# Patient Record
Sex: FEMALE | Race: OTHER | HISPANIC OR LATINO | URBAN - METROPOLITAN AREA
[De-identification: names, ages, dates, MRNs, and addresses within clinical notes are randomized per-mention and may not be internally consistent; named-entity substitution may affect disease eponyms.]

---

## 2024-04-04 ENCOUNTER — EMERGENCY (EMERGENCY)
Facility: HOSPITAL | Age: 44
LOS: 1 days | Discharge: HOME | End: 2024-04-04
Attending: STUDENT IN AN ORGANIZED HEALTH CARE EDUCATION/TRAINING PROGRAM
Payer: COMMERCIAL

## 2024-04-04 VITALS
RESPIRATION RATE: 17 BRPM | HEART RATE: 72 BPM | SYSTOLIC BLOOD PRESSURE: 119 MMHG | TEMPERATURE: 98 F | OXYGEN SATURATION: 99 % | DIASTOLIC BLOOD PRESSURE: 80 MMHG

## 2024-04-04 VITALS
HEIGHT: 60 IN | TEMPERATURE: 98 F | RESPIRATION RATE: 16 BRPM | OXYGEN SATURATION: 99 % | WEIGHT: 188.5 LBS | HEART RATE: 82 BPM

## 2024-04-04 LAB
ALBUMIN SERPL ELPH-MCNC: 4.2 G/DL — SIGNIFICANT CHANGE UP (ref 3.3–5.2)
ALP SERPL-CCNC: 121 U/L — HIGH (ref 40–120)
ALT FLD-CCNC: 47 U/L — HIGH
ANION GAP SERPL CALC-SCNC: 14 MMOL/L — SIGNIFICANT CHANGE UP (ref 5–17)
APPEARANCE UR: ABNORMAL
AST SERPL-CCNC: 47 U/L — HIGH
BACTERIA # UR AUTO: NEGATIVE /HPF — SIGNIFICANT CHANGE UP
BASOPHILS # BLD AUTO: 0.06 K/UL — SIGNIFICANT CHANGE UP (ref 0–0.2)
BASOPHILS NFR BLD AUTO: 0.6 % — SIGNIFICANT CHANGE UP (ref 0–2)
BILIRUB SERPL-MCNC: 0.3 MG/DL — LOW (ref 0.4–2)
BILIRUB UR-MCNC: ABNORMAL
BUN SERPL-MCNC: 11.5 MG/DL — SIGNIFICANT CHANGE UP (ref 8–20)
CALCIUM SERPL-MCNC: 9 MG/DL — SIGNIFICANT CHANGE UP (ref 8.4–10.5)
CHLORIDE SERPL-SCNC: 106 MMOL/L — SIGNIFICANT CHANGE UP (ref 96–108)
CO2 SERPL-SCNC: 23 MMOL/L — SIGNIFICANT CHANGE UP (ref 22–29)
COLOR SPEC: ABNORMAL
CREAT SERPL-MCNC: 1.08 MG/DL — SIGNIFICANT CHANGE UP (ref 0.5–1.3)
DIFF PNL FLD: ABNORMAL
EGFR: 65 ML/MIN/1.73M2 — SIGNIFICANT CHANGE UP
EOSINOPHIL # BLD AUTO: 0.26 K/UL — SIGNIFICANT CHANGE UP (ref 0–0.5)
EOSINOPHIL NFR BLD AUTO: 2.5 % — SIGNIFICANT CHANGE UP (ref 0–6)
GLUCOSE SERPL-MCNC: 82 MG/DL — SIGNIFICANT CHANGE UP (ref 70–99)
GLUCOSE UR QL: NEGATIVE MG/DL — SIGNIFICANT CHANGE UP
HCG SERPL-ACNC: <4 MIU/ML — SIGNIFICANT CHANGE UP
HCT VFR BLD CALC: 35.2 % — SIGNIFICANT CHANGE UP (ref 34.5–45)
HGB BLD-MCNC: 11.7 G/DL — SIGNIFICANT CHANGE UP (ref 11.5–15.5)
IMM GRANULOCYTES NFR BLD AUTO: 0.3 % — SIGNIFICANT CHANGE UP (ref 0–0.9)
KETONES UR-MCNC: NEGATIVE MG/DL — SIGNIFICANT CHANGE UP
LEUKOCYTE ESTERASE UR-ACNC: ABNORMAL
LIDOCAIN IGE QN: 17 U/L — LOW (ref 22–51)
LYMPHOCYTES # BLD AUTO: 4.76 K/UL — HIGH (ref 1–3.3)
LYMPHOCYTES # BLD AUTO: 46.2 % — HIGH (ref 13–44)
MCHC RBC-ENTMCNC: 28.8 PG — SIGNIFICANT CHANGE UP (ref 27–34)
MCHC RBC-ENTMCNC: 33.2 GM/DL — SIGNIFICANT CHANGE UP (ref 32–36)
MCV RBC AUTO: 86.7 FL — SIGNIFICANT CHANGE UP (ref 80–100)
MONOCYTES # BLD AUTO: 0.6 K/UL — SIGNIFICANT CHANGE UP (ref 0–0.9)
MONOCYTES NFR BLD AUTO: 5.8 % — SIGNIFICANT CHANGE UP (ref 2–14)
NEUTROPHILS # BLD AUTO: 4.59 K/UL — SIGNIFICANT CHANGE UP (ref 1.8–7.4)
NEUTROPHILS NFR BLD AUTO: 44.6 % — SIGNIFICANT CHANGE UP (ref 43–77)
NITRITE UR-MCNC: POSITIVE
PH UR: 6.5 — SIGNIFICANT CHANGE UP (ref 5–8)
PLATELET # BLD AUTO: 347 K/UL — SIGNIFICANT CHANGE UP (ref 150–400)
POTASSIUM SERPL-MCNC: 4 MMOL/L — SIGNIFICANT CHANGE UP (ref 3.5–5.3)
POTASSIUM SERPL-SCNC: 4 MMOL/L — SIGNIFICANT CHANGE UP (ref 3.5–5.3)
PROT SERPL-MCNC: 7.4 G/DL — SIGNIFICANT CHANGE UP (ref 6.6–8.7)
PROT UR-MCNC: 100 MG/DL
RBC # BLD: 4.06 M/UL — SIGNIFICANT CHANGE UP (ref 3.8–5.2)
RBC # FLD: 13.8 % — SIGNIFICANT CHANGE UP (ref 10.3–14.5)
RBC CASTS # UR COMP ASSIST: >1900 /HPF — HIGH (ref 0–4)
SODIUM SERPL-SCNC: 143 MMOL/L — SIGNIFICANT CHANGE UP (ref 135–145)
SP GR SPEC: 1.03 — SIGNIFICANT CHANGE UP (ref 1–1.03)
SQUAMOUS # UR AUTO: 4 /HPF — SIGNIFICANT CHANGE UP (ref 0–5)
UROBILINOGEN FLD QL: 1 MG/DL — SIGNIFICANT CHANGE UP (ref 0.2–1)
WBC # BLD: 10.3 K/UL — SIGNIFICANT CHANGE UP (ref 3.8–10.5)
WBC # FLD AUTO: 10.3 K/UL — SIGNIFICANT CHANGE UP (ref 3.8–10.5)
WBC UR QL: 25 /HPF — HIGH (ref 0–5)

## 2024-04-04 PROCEDURE — 99285 EMERGENCY DEPT VISIT HI MDM: CPT

## 2024-04-04 RX ORDER — CEFTRIAXONE 500 MG/1
1000 INJECTION, POWDER, FOR SOLUTION INTRAMUSCULAR; INTRAVENOUS ONCE
Refills: 0 | Status: DISCONTINUED | OUTPATIENT
Start: 2024-04-04 | End: 2024-04-04

## 2024-04-04 RX ORDER — MORPHINE SULFATE 50 MG/1
4 CAPSULE, EXTENDED RELEASE ORAL ONCE
Refills: 0 | Status: DISCONTINUED | OUTPATIENT
Start: 2024-04-04 | End: 2024-04-04

## 2024-04-04 RX ORDER — CEFTRIAXONE 500 MG/1
1000 INJECTION, POWDER, FOR SOLUTION INTRAMUSCULAR; INTRAVENOUS ONCE
Refills: 0 | Status: COMPLETED | OUTPATIENT
Start: 2024-04-04 | End: 2024-04-04

## 2024-04-04 RX ORDER — ACETAMINOPHEN 500 MG
1000 TABLET ORAL ONCE
Refills: 0 | Status: COMPLETED | OUTPATIENT
Start: 2024-04-04 | End: 2024-04-04

## 2024-04-04 RX ORDER — KETOROLAC TROMETHAMINE 30 MG/ML
30 SYRINGE (ML) INJECTION ONCE
Refills: 0 | Status: DISCONTINUED | OUTPATIENT
Start: 2024-04-04 | End: 2024-04-04

## 2024-04-04 RX ORDER — SODIUM CHLORIDE 9 MG/ML
1000 INJECTION INTRAMUSCULAR; INTRAVENOUS; SUBCUTANEOUS ONCE
Refills: 0 | Status: COMPLETED | OUTPATIENT
Start: 2024-04-04 | End: 2024-04-04

## 2024-04-04 RX ADMIN — MORPHINE SULFATE 4 MILLIGRAM(S): 50 CAPSULE, EXTENDED RELEASE ORAL at 23:18

## 2024-04-04 RX ADMIN — MORPHINE SULFATE 4 MILLIGRAM(S): 50 CAPSULE, EXTENDED RELEASE ORAL at 21:28

## 2024-04-04 RX ADMIN — Medication 30 MILLIGRAM(S): at 22:08

## 2024-04-04 RX ADMIN — Medication 400 MILLIGRAM(S): at 20:58

## 2024-04-04 RX ADMIN — SODIUM CHLORIDE 1000 MILLILITER(S): 9 INJECTION INTRAMUSCULAR; INTRAVENOUS; SUBCUTANEOUS at 20:59

## 2024-04-04 RX ADMIN — CEFTRIAXONE 1000 MILLIGRAM(S): 500 INJECTION, POWDER, FOR SOLUTION INTRAMUSCULAR; INTRAVENOUS at 22:07

## 2024-04-04 RX ADMIN — Medication 1000 MILLIGRAM(S): at 21:28

## 2024-04-04 RX ADMIN — MORPHINE SULFATE 4 MILLIGRAM(S): 50 CAPSULE, EXTENDED RELEASE ORAL at 20:58

## 2024-04-04 RX ADMIN — Medication 1000 MILLIGRAM(S): at 21:15

## 2024-04-04 NOTE — ED PROVIDER NOTE - OBJECTIVE STATEMENT
43F hx of ureterolithiasis diagnosed in [place] (where she recently moved here from) presenting for evaluation of suprapubic pain and recurrent hematuria; feels like when she had he kidney stone before, notes she has a stent currently which was going to be removed but she has since changed residence to [place] and has not yet had time to establish care with urology. Denies fevers, +chills, +dysuria and hematuria. Does not remember what side her kidney stone was on or which side the stent is on, was told she needed the stent because the stone was too large to pass. Notes she had surpapubic pain when dx with the stone last time as well.

## 2024-04-04 NOTE — ED PROVIDER NOTE - NSFOLLOWUPCLINICS_GEN_ALL_ED_FT
[facility] Acute Care Surgery  Acute Care Surgery  [address] [place] [id number]  Phone: [phone]  Fax:

## 2024-04-04 NOTE — ED PROVIDER NOTE - PHYSICAL EXAMINATION
Gen: NAD, non-toxic, conversational  Eyes: PERRLA, EOMI   HENT: Normocephalic, atraumatic. External ears normal, no rhinorrhea, moist mucous membranes.   CV: RRR, no M/R/G  Resp: CTAB, non-labored, speaking without difficulty on room air  Abd: soft, +suprapubic ttp, o/w non-tender, non rigid, no guarding or rebound tenderness  Back: No CVAT bilaterally, no midline ttp  Skin: dry, wwp   Neuro: AOx3, speech is fluent and appropriate  Psych: Mood ok, affect euthymic

## 2024-04-04 NOTE — ED ADULT NURSE NOTE - SUICIDE SCREENING DEPRESSION
Problem: Falls - Risk of:  Goal: Will remain free from falls  Description: Will remain free from falls  1/22/2022 0425 by Candida John LPN  Outcome: Ongoing     Problem: Falls - Risk of:  Goal: Absence of physical injury  Description: Absence of physical injury  Outcome: Ongoing     Problem: Skin Integrity:  Goal: Will show no infection signs and symptoms  Description: Will show no infection signs and symptoms  1/22/2022 0425 by Candida John LPN  Outcome: Ongoing     Problem: Skin Integrity:  Goal: Absence of new skin breakdown  Description: Absence of new skin breakdown  Outcome: Ongoing     Problem: Pain:  Goal: Pain level will decrease  Description: Pain level will decrease  1/22/2022 0425 by Candida John LPN  Outcome: Ongoing     Problem: Pain:  Goal: Control of acute pain  Description: Control of acute pain  Outcome: Ongoing     Problem: Pain:  Goal: Control of chronic pain  Description: Control of chronic pain  Outcome: Ongoing     Problem: Musculor/Skeletal Functional Status  Goal: Highest potential functional level  Outcome: Ongoing     Problem: Musculor/Skeletal Functional Status  Goal: Absence of falls  Outcome: Ongoing     Problem: Nutrition  Goal: Optimal nutrition therapy  Outcome: Ongoing Negative

## 2024-04-04 NOTE — ED PROVIDER NOTE - CARE PROVIDER_API CALL
Jakob Zamora  Urology  [address], [address]  [place] [address]  Phone: [phone]  Fax: [phone]  Follow Up Time:

## 2024-04-04 NOTE — ED ADULT NURSE NOTE - NSFALLUNIVINTERV_ED_ALL_ED
Bed/Stretcher in lowest position, wheels locked, appropriate side rails in place/Call bell, personal items and telephone in reach/Instruct patient to call for assistance before getting out of bed/chair/stretcher/Non-slip footwear applied when patient is off stretcher/[place] to call system/Physically safe environment - no spills, clutter or unnecessary equipment/Purposeful proactive rounding/Room/bathroom lighting operational, light cord in reach

## 2024-04-04 NOTE — ED ADULT TRIAGE NOTE - CHIEF COMPLAINT QUOTE
Pt was diagnosed with large kidney stones in the beginning of March. She had a stent placed and needs to follow up with urology but she just moved to [place] and doesn't have one yet. She now has increased right flank pain, hematuria, and only able to urinate little drops at a time.

## 2024-04-04 NOTE — ED ADULT NURSE NOTE - OBJECTIVE STATEMENT
pt was recently dx with kidney stones and had stents placed in 3/10 in [place]. She was told to f/u with urologist to have stents removed but pt moved to [place] and has not been able to see an urologist. For the last few days, pt has been having right flank pain radiating to lower back and BLQ. Pt also endorse urgency, hematuria, burning during voiding, nausea and fatigue.  AO4, ED workup.

## 2024-04-04 NOTE — ED PROVIDER NOTE - CLINICAL SUMMARY MEDICAL DECISION MAKING FREE TEXT BOX
43F presenting for evaluation of abdominal pain in setting of recent ureteral stenting for large ureterolithiasis, does have discharge paperwork with her unfortunately does not have any information about when stent was placed or laterality; had been done in [place]. Will check labs, CT renal to assess stent, follow up studies, reassess, possible urology consultation pending findings, dispo pending clinical course and results. 43F presenting for evaluation of abdominal pain in setting of recent ureteral stenting for large ureterolithiasis, does have discharge paperwork with her unfortunately does not have any information about when stent was placed or laterality; had been done in [place]. Will check labs, CT renal to assess stent, follow up studies, reassess, possible urology consultation pending findings, dispo pending clinical course and results.    Pt reassessed, pt feeling better at this time, vss, pt able to walk, talk and vocalized plan of action. Discussed in depth and explained to pt in depth the next steps that need to be taken including proper follow up with PCP or specialists. All incidental findings were discussed with pt as well. Pt verbalized their concerns and all questions were answered. Pt understands dispo and wants discharge. Given good instructions when to return to ED, strict return precautions and importance of f/u.

## 2024-04-04 NOTE — ED PROVIDER NOTE - PATIENT PORTAL LINK FT
You can access the FollowMyHealth Patient Portal offered by [facility] by registering at the following website: [email or web address]. By joining [employer]’s FollowMyHealth portal, you will also be able to view your health information using other applications (apps) compatible with our system.

## 2024-04-05 LAB
ALBUMIN SERPL ELPH-MCNC: 3.8 G/DL — SIGNIFICANT CHANGE UP (ref 3.3–5.2)
ALP SERPL-CCNC: 102 U/L — SIGNIFICANT CHANGE UP (ref 40–120)
ALT FLD-CCNC: 37 U/L — HIGH
AST SERPL-CCNC: 39 U/L — HIGH
BILIRUB DIRECT SERPL-MCNC: 0.1 MG/DL — SIGNIFICANT CHANGE UP (ref 0–0.3)
BILIRUB INDIRECT FLD-MCNC: 0.2 MG/DL — SIGNIFICANT CHANGE UP (ref 0.2–1)
BILIRUB SERPL-MCNC: 0.3 MG/DL — LOW (ref 0.4–2)
PROT SERPL-MCNC: 6.6 G/DL — SIGNIFICANT CHANGE UP (ref 6.6–8.7)

## 2024-04-05 PROCEDURE — 76705 ECHO EXAM OF ABDOMEN: CPT

## 2024-04-05 PROCEDURE — 80053 COMPREHEN METABOLIC PANEL: CPT

## 2024-04-05 PROCEDURE — 84702 CHORIONIC GONADOTROPIN TEST: CPT

## 2024-04-05 PROCEDURE — 85025 COMPLETE CBC W/AUTO DIFF WBC: CPT

## 2024-04-05 PROCEDURE — 87086 URINE CULTURE/COLONY COUNT: CPT

## 2024-04-05 PROCEDURE — 96376 TX/PRO/DX INJ SAME DRUG ADON: CPT

## 2024-04-05 PROCEDURE — 36415 COLL VENOUS BLD VENIPUNCTURE: CPT

## 2024-04-05 PROCEDURE — 96375 TX/PRO/DX INJ NEW DRUG ADDON: CPT

## 2024-04-05 PROCEDURE — 83690 ASSAY OF LIPASE: CPT

## 2024-04-05 PROCEDURE — 76705 ECHO EXAM OF ABDOMEN: CPT | Mod: 26

## 2024-04-05 PROCEDURE — 74176 CT ABD & PELVIS W/O CONTRAST: CPT | Mod: 26,MC

## 2024-04-05 PROCEDURE — 74176 CT ABD & PELVIS W/O CONTRAST: CPT | Mod: MC

## 2024-04-05 PROCEDURE — 99284 EMERGENCY DEPT VISIT MOD MDM: CPT | Mod: 25

## 2024-04-05 PROCEDURE — 96365 THER/PROPH/DIAG IV INF INIT: CPT

## 2024-04-05 PROCEDURE — 80076 HEPATIC FUNCTION PANEL: CPT

## 2024-04-05 PROCEDURE — 81001 URINALYSIS AUTO W/SCOPE: CPT

## 2024-04-05 RX ORDER — IBUPROFEN 200 MG
1 TABLET ORAL
Qty: 28 | Refills: 0
Start: 2024-04-05 | End: 2024-04-11

## 2024-04-05 RX ORDER — MORPHINE SULFATE 50 MG/1
4 CAPSULE, EXTENDED RELEASE ORAL ONCE
Refills: 0 | Status: DISCONTINUED | OUTPATIENT
Start: 2024-04-05 | End: 2024-04-05

## 2024-04-05 RX ORDER — CEFPODOXIME PROXETIL 100 MG
1 TABLET ORAL
Qty: 14 | Refills: 0
Start: 2024-04-05 | End: 2024-04-11

## 2024-04-05 RX ADMIN — MORPHINE SULFATE 4 MILLIGRAM(S): 50 CAPSULE, EXTENDED RELEASE ORAL at 01:45

## 2024-04-05 RX ADMIN — MORPHINE SULFATE 4 MILLIGRAM(S): 50 CAPSULE, EXTENDED RELEASE ORAL at 01:29

## 2024-04-05 RX ADMIN — Medication 30 MILLIGRAM(S): at 01:29

## 2024-04-06 LAB
CULTURE RESULTS: ABNORMAL
SPECIMEN SOURCE: SIGNIFICANT CHANGE UP

## 2024-04-09 ENCOUNTER — APPOINTMENT (OUTPATIENT)
Dept: TRAUMA SURGERY | Facility: CLINIC | Age: 44
End: 2024-04-09

## 2024-04-09 PROBLEM — Z00.00 ENCOUNTER FOR PREVENTIVE HEALTH EXAMINATION: Status: ACTIVE | Noted: 2024-04-09

## 2024-04-11 ENCOUNTER — APPOINTMENT (OUTPATIENT)
Dept: UROLOGY | Facility: CLINIC | Age: 44
End: 2024-04-11
Payer: COMMERCIAL

## 2024-04-11 DIAGNOSIS — Z87.39 PERSONAL HISTORY OF OTHER DISEASES OF THE MUSCULOSKELETAL SYSTEM AND CONNECTIVE TISSUE: ICD-10-CM

## 2024-04-11 DIAGNOSIS — Z78.9 OTHER SPECIFIED HEALTH STATUS: ICD-10-CM

## 2024-04-11 DIAGNOSIS — M50.30 OTHER CERVICAL DISC DEGENERATION, UNSPECIFIED CERVICAL REGION: ICD-10-CM

## 2024-04-11 PROCEDURE — 99204 OFFICE O/P NEW MOD 45 MIN: CPT

## 2024-04-11 NOTE — REVIEW OF SYSTEMS
[Feeling Tired] : feeling tired [Abdominal Pain] : abdominal pain [Vomiting] : vomiting [Constipation] : constipation [Blood in urine that you can see] : blood visible in urine [History of kidney stones] : history of kidney stones [see HPI] : see HPI [Joint Pain] : joint pain [Negative] : Heme/Lymph

## 2024-04-12 PROBLEM — Z78.9 NON-SMOKER: Status: ACTIVE | Noted: 2024-04-12

## 2024-04-12 PROBLEM — M50.30 BULGING OF CERVICAL INTERVERTEBRAL DISC: Status: RESOLVED | Noted: 2024-04-12 | Resolved: 2024-04-12

## 2024-04-12 PROBLEM — Z87.39 HISTORY OF LOW BACK PAIN: Status: RESOLVED | Noted: 2024-04-12 | Resolved: 2024-04-12

## 2024-04-12 NOTE — PHYSICAL EXAM
[Normal Appearance] : normal appearance [General Appearance - In No Acute Distress] : no acute distress [Abdomen Soft] : soft [] : no respiratory distress [RUQ] : in the right upper quadrant [Normal Station and Gait] : the gait and station were normal for the patient's age [Skin Color & Pigmentation] : normal skin color and pigmentation [No Focal Deficits] : no focal deficits [Oriented To Time, Place, And Person] : oriented to person, place, and time [Costovertebral Angle Tenderness] : no ~M costovertebral angle tenderness [de-identified] : normal peripheral circulation

## 2024-04-12 NOTE — ASSESSMENT
[FreeTextEntry1] : Reviewed records. Discussed labs and imaging.   4/4/2024: WBC: 10.10, Creatinine: 1.08 Urine culture: Streptococcus agalactiae CT scan of the abdomen and pelvis: 1 cm right lower pole kidney stone with indwelling ureteral stent. Multiple left kidney stones, largest 0.6 cm.  Disorder of calcium metabolism: Discussed Kidney stone prevention diet: Recommended good oral hydration so that urine is clear to light yellow, usually 1.5 to 2 Liters of fluids, mainly water. Recommended increasing Citrate in diet by consuming citrus fruits and juices: holly, limes, oranges, grapefruits and berries. Recommended to consume less red meat. Recommended less salt intake.  Recommended to limit foods with oxalate: dark green vegetables, rhubarb, chocolate, wheat bran, nuts, cranberries, and beans.  Patient was provided with instruction sheet.  Will do kidney stone metabolic workup at a later date.  Kidney stone: Discussed the options of Observation Vs Medical management Vs Ureteroscopy Vs Shock wave lithotripsy Vs Percutaneous nephrostolithotomy.  Risks and benefits of each modality were discussed. Patient agreeable to proceed with ureteroscopy. Discussed the procedure: Ureteroscopy. Risks associated including but not limited to bleeding, urinary tract infection, urinary sepsis, failure of the procedure.  Discussed risk of injury to the ureter including perforation and or avulsion. Discussed risk of injury to other surrounding structures, ureteral stricture formation, urinoma, urinary fistula, failure to remove all stone fragments and need for additional procedures: ESWL, repeat ureteroscopy, percutaneous or open surgery and or reconstructive surgery.  Discussed need for ureteral stent, pain and discomfort associated with it, possible risk of proximal or distal migration of it and need for subsequent Cystoscopy to remove the ureteral stent.  Discussed that Anesthesiologist will discuss the risks and complications associated with Anesthesia in more depth separately.  Will need medical clearance with PCP and presurgical testing at [facility]. Patient agreeable to proceed with Ureteroscopy.  Will schedule for next available date [facility].   Recommended to see surgeon for right-sided abdominal pain and gallbladder sludge.

## 2024-04-12 NOTE — HISTORY OF PRESENT ILLNESS
[FreeTextEntry1] : 43-year-old female presents for kidney stone and ureteral stent. Patient went to emergency department at the hospital in [place] in March with complaints of flank pain. As per patient a ureteral stent was placed for obstructing right kidney stone.  No prior episodes.  No family history of kidney stones. Patient has since relocated to [place], [place]. Patient went to [facility] ED on 4/4/2024 with complaints of right-sided abdominal pain and gross hematuria. CT scan abdomen and pelvis: Has bilateral kidney stones with indwelling right ureteral stent. Also had ultrasound, was told has gallbladder sludge.  Will be seeing surgeon for possible cholecystectomy. Patient was sent home on cefpodoxime, gross hematuria has resolved continues to have right-sided abdominal pain. Denies any difficulty with urination.  Denies nausea, vomiting, fever, chills or rigors.

## 2024-04-23 ENCOUNTER — APPOINTMENT (OUTPATIENT)
Dept: INTERNAL MEDICINE | Facility: CLINIC | Age: 44
End: 2024-04-23

## 2024-04-25 ENCOUNTER — RESULT REVIEW (OUTPATIENT)
Age: 44
End: 2024-04-25

## 2024-04-25 ENCOUNTER — OUTPATIENT (OUTPATIENT)
Dept: OUTPATIENT SERVICES | Facility: HOSPITAL | Age: 44
LOS: 1 days | End: 2024-04-25
Payer: COMMERCIAL

## 2024-04-25 VITALS
DIASTOLIC BLOOD PRESSURE: 54 MMHG | SYSTOLIC BLOOD PRESSURE: 121 MMHG | HEART RATE: 83 BPM | TEMPERATURE: 98 F | WEIGHT: 183.65 LBS | HEIGHT: 60 IN | RESPIRATION RATE: 16 BRPM | OXYGEN SATURATION: 98 %

## 2024-04-25 DIAGNOSIS — Z01.818 ENCOUNTER FOR OTHER PREPROCEDURAL EXAMINATION: ICD-10-CM

## 2024-04-25 DIAGNOSIS — Z45.89 ENCOUNTER FOR ADJUSTMENT AND MANAGEMENT OF OTHER IMPLANTED DEVICES: Chronic | ICD-10-CM

## 2024-04-25 DIAGNOSIS — Z90.79 ACQUIRED ABSENCE OF OTHER GENITAL ORGAN(S): Chronic | ICD-10-CM

## 2024-04-25 DIAGNOSIS — Z96.0 PRESENCE OF UROGENITAL IMPLANTS: Chronic | ICD-10-CM

## 2024-04-25 DIAGNOSIS — Z90.711 ACQUIRED ABSENCE OF UTERUS WITH REMAINING CERVICAL STUMP: Chronic | ICD-10-CM

## 2024-04-25 LAB
ABO RH CONFIRMATION: SIGNIFICANT CHANGE UP
ANION GAP SERPL CALC-SCNC: 3 MMOL/L — LOW (ref 5–17)
APPEARANCE UR: ABNORMAL
APTT BLD: 33.7 SEC — SIGNIFICANT CHANGE UP (ref 24.5–35.6)
BACTERIA # UR AUTO: NEGATIVE /HPF — SIGNIFICANT CHANGE UP
BASOPHILS # BLD AUTO: 0.04 K/UL — SIGNIFICANT CHANGE UP (ref 0–0.2)
BASOPHILS NFR BLD AUTO: 0.5 % — SIGNIFICANT CHANGE UP (ref 0–2)
BILIRUB UR-MCNC: NEGATIVE — SIGNIFICANT CHANGE UP
BLD GP AB SCN SERPL QL: SIGNIFICANT CHANGE UP
BUN SERPL-MCNC: 22 MG/DL — SIGNIFICANT CHANGE UP (ref 7–23)
CALCIUM SERPL-MCNC: 9.2 MG/DL — SIGNIFICANT CHANGE UP (ref 8.5–10.1)
CAST: 3 /LPF — SIGNIFICANT CHANGE UP (ref 0–4)
CHLORIDE SERPL-SCNC: 111 MMOL/L — HIGH (ref 96–108)
CO2 SERPL-SCNC: 25 MMOL/L — SIGNIFICANT CHANGE UP (ref 22–31)
COLOR SPEC: YELLOW — SIGNIFICANT CHANGE UP
CREAT SERPL-MCNC: 0.93 MG/DL — SIGNIFICANT CHANGE UP (ref 0.5–1.3)
DIFF PNL FLD: ABNORMAL
EGFR: 78 ML/MIN/1.73M2 — SIGNIFICANT CHANGE UP
EOSINOPHIL # BLD AUTO: 0.13 K/UL — SIGNIFICANT CHANGE UP (ref 0–0.5)
EOSINOPHIL NFR BLD AUTO: 1.5 % — SIGNIFICANT CHANGE UP (ref 0–6)
GLUCOSE SERPL-MCNC: 93 MG/DL — SIGNIFICANT CHANGE UP (ref 70–99)
GLUCOSE UR QL: NEGATIVE MG/DL — SIGNIFICANT CHANGE UP
HCT VFR BLD CALC: 37.9 % — SIGNIFICANT CHANGE UP (ref 34.5–45)
HGB BLD-MCNC: 12.2 G/DL — SIGNIFICANT CHANGE UP (ref 11.5–15.5)
IMM GRANULOCYTES NFR BLD AUTO: 0.2 % — SIGNIFICANT CHANGE UP (ref 0–0.9)
INR BLD: 0.93 RATIO — SIGNIFICANT CHANGE UP (ref 0.85–1.18)
KETONES UR-MCNC: NEGATIVE MG/DL — SIGNIFICANT CHANGE UP
LEUKOCYTE ESTERASE UR-ACNC: ABNORMAL
LYMPHOCYTES # BLD AUTO: 2.98 K/UL — SIGNIFICANT CHANGE UP (ref 1–3.3)
LYMPHOCYTES # BLD AUTO: 34 % — SIGNIFICANT CHANGE UP (ref 13–44)
MCHC RBC-ENTMCNC: 28.4 PG — SIGNIFICANT CHANGE UP (ref 27–34)
MCHC RBC-ENTMCNC: 32.2 GM/DL — SIGNIFICANT CHANGE UP (ref 32–36)
MCV RBC AUTO: 88.3 FL — SIGNIFICANT CHANGE UP (ref 80–100)
MONOCYTES # BLD AUTO: 0.54 K/UL — SIGNIFICANT CHANGE UP (ref 0–0.9)
MONOCYTES NFR BLD AUTO: 6.2 % — SIGNIFICANT CHANGE UP (ref 2–14)
NEUTROPHILS # BLD AUTO: 5.06 K/UL — SIGNIFICANT CHANGE UP (ref 1.8–7.4)
NEUTROPHILS NFR BLD AUTO: 57.6 % — SIGNIFICANT CHANGE UP (ref 43–77)
NITRITE UR-MCNC: NEGATIVE — SIGNIFICANT CHANGE UP
PH UR: 7 — SIGNIFICANT CHANGE UP (ref 5–8)
PLATELET # BLD AUTO: 348 K/UL — SIGNIFICANT CHANGE UP (ref 150–400)
POTASSIUM SERPL-MCNC: 4.2 MMOL/L — SIGNIFICANT CHANGE UP (ref 3.5–5.3)
POTASSIUM SERPL-SCNC: 4.2 MMOL/L — SIGNIFICANT CHANGE UP (ref 3.5–5.3)
PROT UR-MCNC: 30 MG/DL
PROTHROM AB SERPL-ACNC: 10.5 SEC — SIGNIFICANT CHANGE UP (ref 9.5–13)
RBC # BLD: 4.29 M/UL — SIGNIFICANT CHANGE UP (ref 3.8–5.2)
RBC # FLD: 13.2 % — SIGNIFICANT CHANGE UP (ref 10.3–14.5)
RBC CASTS # UR COMP ASSIST: 523 /HPF — HIGH (ref 0–4)
SODIUM SERPL-SCNC: 139 MMOL/L — SIGNIFICANT CHANGE UP (ref 135–145)
SP GR SPEC: 1.02 — SIGNIFICANT CHANGE UP (ref 1–1.03)
SQUAMOUS # UR AUTO: 2 /HPF — SIGNIFICANT CHANGE UP (ref 0–5)
UROBILINOGEN FLD QL: 1 MG/DL — SIGNIFICANT CHANGE UP (ref 0.2–1)
WBC # BLD: 8.77 K/UL — SIGNIFICANT CHANGE UP (ref 3.8–10.5)
WBC # FLD AUTO: 8.77 K/UL — SIGNIFICANT CHANGE UP (ref 3.8–10.5)
WBC UR QL: 5 /HPF — SIGNIFICANT CHANGE UP (ref 0–5)

## 2024-04-25 PROCEDURE — 36415 COLL VENOUS BLD VENIPUNCTURE: CPT

## 2024-04-25 PROCEDURE — 87086 URINE CULTURE/COLONY COUNT: CPT

## 2024-04-25 PROCEDURE — 86850 RBC ANTIBODY SCREEN: CPT

## 2024-04-25 PROCEDURE — 71046 X-RAY EXAM CHEST 2 VIEWS: CPT

## 2024-04-25 PROCEDURE — 99214 OFFICE O/P EST MOD 30 MIN: CPT | Mod: 25

## 2024-04-25 PROCEDURE — 80048 BASIC METABOLIC PNL TOTAL CA: CPT

## 2024-04-25 PROCEDURE — 85730 THROMBOPLASTIN TIME PARTIAL: CPT

## 2024-04-25 PROCEDURE — 86900 BLOOD TYPING SEROLOGIC ABO: CPT

## 2024-04-25 PROCEDURE — 86901 BLOOD TYPING SEROLOGIC RH(D): CPT

## 2024-04-25 PROCEDURE — 85025 COMPLETE CBC W/AUTO DIFF WBC: CPT

## 2024-04-25 PROCEDURE — 93005 ELECTROCARDIOGRAM TRACING: CPT

## 2024-04-25 PROCEDURE — 93010 ELECTROCARDIOGRAM REPORT: CPT

## 2024-04-25 PROCEDURE — 71046 X-RAY EXAM CHEST 2 VIEWS: CPT | Mod: 26

## 2024-04-25 PROCEDURE — 81001 URINALYSIS AUTO W/SCOPE: CPT

## 2024-04-25 PROCEDURE — 85610 PROTHROMBIN TIME: CPT

## 2024-04-25 NOTE — H&P PST ADULT - NSICDXPASTSURGICALHX_GEN_ALL_CORE_FT
PAST SURGICAL HISTORY:  Encounter for removal of Essure     History of bilateral salpingectomy     S/P cystoscopy with ureteral stent placement

## 2024-04-25 NOTE — H&P PST ADULT - MAMMOGRAM, LAST, PROFILE
Detail Level: Detailed
Patient Specific Counseling (Will Not Stick From Patient To Patient): Continue Ketoconazole shampoo 2-3x weekly prn flares and Clobetasol solution BID x1-2 weeks prn flares; take breaks when not flaring
Patient Specific Counseling (Will Not Stick From Patient To Patient): Continue Fluocinonide cream BID x1-2 weeks prn flares; take breaks when not flaring
none

## 2024-04-25 NOTE — H&P PST ADULT - HISTORY OF PRESENT ILLNESS
43 year old female with right renal calculi c/o flank pain and hematuria; Had ureteral stent placed in [place] in March 2024; she presents to [facility] for planned right ureteroscopy laser lithotripsy stone extraction and ureteral stent exchange

## 2024-04-25 NOTE — H&P PST ADULT - NSICDXFAMILYHX_GEN_ALL_CORE_FT
FAMILY HISTORY:  Father  Still living? Unknown  FH: heart disease, Age at diagnosis: Age Unknown    Mother  Still living? Unknown  Family history of high cholesterol, Age at diagnosis: Age Unknown  Family history of lupus erythematosus, Age at diagnosis: Age Unknown  FH: asthma, Age at diagnosis: Age Unknown  FH: chronic kidney disease, Age at diagnosis: Age Unknown  FH: COPD (chronic obstructive pulmonary disease), Age at diagnosis: Age Unknown  FH: HTN (hypertension), Age at diagnosis: Age Unknown  FH: hypothyroidism, Age at diagnosis: Age Unknown  FH: type 2 diabetes, Age at diagnosis: Age Unknown    Grandparent  Still living? Unknown  FH: heart disease, Age at diagnosis: Age Unknown

## 2024-04-25 NOTE — H&P PST ADULT - NSANTHOSAYNRD_GEN_A_CORE
No. LAYA screening performed.  STOP BANG Legend: 0-2 = LOW Risk; 3-4 = INTERMEDIATE Risk; 5-8 = HIGH Risk

## 2024-04-25 NOTE — H&P PST ADULT - ASSESSMENT
Plan:  1. PST instructions given ; NPO status/  instructions to be given by ASU   2. Pt instructed to take following meds on day of surgery:   3. Pt instructed to take routine evening medications unless indicated   4. Stop NSAIDS ( Aspirin Alev Motrin Mobic Diclofenac), herbal supplements , MVI , Vitamin fish oil 7 days prior to surgery  unless   directed by surgeon or cardiologist;   5. Medical Optimization  with    6. Urine for pregnancy on day of surgery   7. Labs EKG CXR as per surgeon request   8. Pt denies covid symptoms shortness of breath fever cough      43 year old female with right renal calculi c/o flank pain and hematuria; Had ureteral stent placed in [place] in March 2024; she presents to [facility] for planned right ureteroscopy laser lithotripsy stone extraction and ureteral stent exchange         Plan:  1. PST instructions given ; NPO status/  instructions to be given by ASU   2. Pt instructed to take following meds on day of surgery:   3. Pt instructed to take routine evening medications unless indicated   4. Stop NSAIDS ( Aspirin Alev Motrin Mobic Diclofenac), herbal supplements , MVI , Vitamin fish oil 7 days prior to surgery  unless   directed by surgeon or cardiologist;   5. Medical Optimization  with Dr Zambrano  6. Pt denies covid symptoms shortness of breath fever cough   7. Labs EKG CXR as per surgeon request

## 2024-04-25 NOTE — H&P PST ADULT - NSICDXPASTMEDICALHX_GEN_ALL_CORE_FT
PAST MEDICAL HISTORY:  Bleeding gastric ulcer     Cervical herniated disc     Chronic sinusitis     Degenerative lumbar disc     Fibromyalgia     Gallbladder sludge     Gastric peptic ulcer     HPV in female     Migraine     Obese     Right kidney stone     Seasonal allergies      PAST MEDICAL HISTORY:  Bleeding gastric ulcer     Cervical herniated disc     Chronic sinusitis     Degenerative lumbar disc     Fibromyalgia     Gallbladder sludge     Gastric peptic ulcer     HPV in female     Migraine     Obese     Obesity     Right kidney stone     Seasonal allergies

## 2024-04-26 DIAGNOSIS — Z01.818 ENCOUNTER FOR OTHER PREPROCEDURAL EXAMINATION: ICD-10-CM

## 2024-04-27 LAB
CULTURE RESULTS: SIGNIFICANT CHANGE UP
SPECIMEN SOURCE: SIGNIFICANT CHANGE UP

## 2024-04-30 ENCOUNTER — NON-APPOINTMENT (OUTPATIENT)
Age: 44
End: 2024-04-30

## 2024-04-30 ENCOUNTER — APPOINTMENT (OUTPATIENT)
Dept: INTERNAL MEDICINE | Facility: CLINIC | Age: 44
End: 2024-04-30
Payer: COMMERCIAL

## 2024-04-30 VITALS
BODY MASS INDEX: 35.34 KG/M2 | HEIGHT: 60 IN | HEART RATE: 97 BPM | OXYGEN SATURATION: 97 % | SYSTOLIC BLOOD PRESSURE: 126 MMHG | DIASTOLIC BLOOD PRESSURE: 78 MMHG | WEIGHT: 180 LBS | RESPIRATION RATE: 14 BRPM

## 2024-04-30 DIAGNOSIS — Z01.818 ENCOUNTER FOR OTHER PREPROCEDURAL EXAMINATION: ICD-10-CM

## 2024-04-30 DIAGNOSIS — G47.09 OTHER INSOMNIA: ICD-10-CM

## 2024-04-30 DIAGNOSIS — M79.7 FIBROMYALGIA: ICD-10-CM

## 2024-04-30 DIAGNOSIS — Z82.49 FAMILY HISTORY OF ISCHEMIC HEART DISEASE AND OTHER DISEASES OF THE CIRCULATORY SYSTEM: ICD-10-CM

## 2024-04-30 DIAGNOSIS — G43.909 MIGRAINE, UNSPECIFIED, NOT INTRACTABLE, W/OUT STATUS MIGRAINOSUS: ICD-10-CM

## 2024-04-30 DIAGNOSIS — Z82.69 FAMILY HISTORY OF OTHER DISEASES OF THE MUSCULOSKELETAL SYSTEM AND CONNECTIVE TISSUE: ICD-10-CM

## 2024-04-30 DIAGNOSIS — Z83.438 FAMILY HISTORY OF OTHER DISORDER OF LIPOPROTEIN METABOLISM AND OTHER LIPIDEMIA: ICD-10-CM

## 2024-04-30 PROBLEM — J30.2 OTHER SEASONAL ALLERGIC RHINITIS: Chronic | Status: ACTIVE | Noted: 2024-04-25

## 2024-04-30 PROBLEM — N20.0 CALCULUS OF KIDNEY: Chronic | Status: ACTIVE | Noted: 2024-04-25

## 2024-04-30 PROBLEM — K82.8 OTHER SPECIFIED DISEASES OF GALLBLADDER: Chronic | Status: ACTIVE | Noted: 2024-04-25

## 2024-04-30 PROBLEM — E66.9 OBESITY, UNSPECIFIED: Chronic | Status: ACTIVE | Noted: 2024-04-25

## 2024-04-30 PROBLEM — M51.36 OTHER INTERVERTEBRAL DISC DEGENERATION, LUMBAR REGION: Chronic | Status: ACTIVE | Noted: 2024-04-25

## 2024-04-30 PROBLEM — M50.20 OTHER CERVICAL DISC DISPLACEMENT, UNSPECIFIED CERVICAL REGION: Chronic | Status: ACTIVE | Noted: 2024-04-25

## 2024-04-30 PROBLEM — J32.9 CHRONIC SINUSITIS, UNSPECIFIED: Chronic | Status: ACTIVE | Noted: 2024-04-25

## 2024-04-30 PROBLEM — B97.7 PAPILLOMAVIRUS AS THE CAUSE OF DISEASES CLASSIFIED ELSEWHERE: Chronic | Status: ACTIVE | Noted: 2024-04-25

## 2024-04-30 PROBLEM — K25.9 GASTRIC ULCER, UNSPECIFIED AS ACUTE OR CHRONIC, WITHOUT HEMORRHAGE OR PERFORATION: Chronic | Status: ACTIVE | Noted: 2024-04-25

## 2024-04-30 PROBLEM — K25.4 CHRONIC OR UNSPECIFIED GASTRIC ULCER WITH HEMORRHAGE: Chronic | Status: ACTIVE | Noted: 2024-04-25

## 2024-04-30 PROCEDURE — 99203 OFFICE O/P NEW LOW 30 MIN: CPT

## 2024-04-30 RX ORDER — PREGABALIN 200 MG/1
200 CAPSULE ORAL TWICE DAILY
Refills: 0 | Status: ACTIVE | COMMUNITY

## 2024-04-30 RX ORDER — OXYCODONE HCL/ACETAMINOPHEN 7.5-500 MG
500-7.5 TABLET ORAL
Refills: 0 | Status: ACTIVE | COMMUNITY

## 2024-04-30 RX ORDER — TOPIRAMATE 50 MG/1
50 TABLET, COATED ORAL TWICE DAILY
Refills: 0 | Status: ACTIVE | COMMUNITY

## 2024-04-30 RX ORDER — PREGABALIN 300 MG/1
CAPSULE ORAL
Refills: 0 | Status: DISCONTINUED | COMMUNITY

## 2024-04-30 RX ORDER — TOPIRAMATE 50 MG/1
TABLET, COATED ORAL
Refills: 0 | Status: DISCONTINUED | COMMUNITY

## 2024-04-30 NOTE — ASSESSMENT
[High Risk Surgery - Intraperitoneal, Intrathoracic or Supringuinal Vascular Procedures] : High Risk Surgery - Intraperitoneal, Intrathoracic or Supringuinal Vascular Procedures - No (0) [Ischemic Heart Disease] : Ischemic Heart Disease - No (0) [Congestive Heart Failure] : Congestive Heart Failure - No (0) [Prior Cerebrovascular Accident or TIA] : Prior Cerebrovascular Accident or TIA - No (0) [Creatinine >= 2mg/dL (1 Point)] : Creatinine >= 2mg/dL - No (0) [Insulin-dependent Diabetic (1 Point)] : Insulin-dependent Diabetic - No (0) [0] : 0 , RCRI Class: I, Risk of Post-Op Cardiac Complications: 3.9%, 95% CI for Risk Estimate: 2.8% - 5.4% [Patient Optimized for Surgery] : Patient optimized for surgery [No Further Testing Recommended] : no further testing recommended [As per surgery] : as per surgery [FreeTextEntry4] : 43 year old female with PMH fibromyalgia, insomnia, migraines who presents today for medical clearance prior to ureteroscopy and lithotripsy. She feels well, has no acute complaints.  She has no chest pain, palpitations, SOB, orthopnea, PND, LE edema.  She has good functional capacity and can perform > 4 METs without difficulty.  Her preoperative labs, EKG and CXR have been reviweed and are within acceptable limits for surgery.  The patient is medically optimized for planned procedure.

## 2024-04-30 NOTE — HISTORY OF PRESENT ILLNESS
[No Pertinent Cardiac History] : no history of aortic stenosis, atrial fibrillation, coronary artery disease, recent myocardial infarction, or implantable device/pacemaker [No Pertinent Pulmonary History] : no history of asthma, COPD, sleep apnea, or smoking [No Adverse Anesthesia Reaction] : no adverse anesthesia reaction in self or family member [(Patient denies any chest pain, claudication, dyspnea on exertion, orthopnea, palpitations or syncope)] : Patient denies any chest pain, claudication, dyspnea on exertion, orthopnea, palpitations or syncope [Moderate (4-6 METs)] : Moderate (4-6 METs) [Chronic Anticoagulation] : no chronic anticoagulation [Chronic Kidney Disease] : no chronic kidney disease [Diabetes] : no diabetes [FreeTextEntry1] : ureteroscopy and lithotripsy  [FreeTextEntry2] : 5/6/24 [FreeTextEntry3] : Dr. Mckeon  [FreeTextEntry4] : 43 year old female with PMH fibromyalgia, insomnia, migraines who presents today to establish care. She recently moved to the area from [place] last month. She had a right ureteral stent placed in March and will be undergoing stent removal and lithotripsy on 5/6/24. She feels well and has no acute complaints today. She still has intermittent hematuria but no UTI symptoms.

## 2024-05-01 ENCOUNTER — NON-APPOINTMENT (OUTPATIENT)
Age: 44
End: 2024-05-01

## 2024-05-01 ENCOUNTER — APPOINTMENT (OUTPATIENT)
Dept: UROLOGY | Facility: CLINIC | Age: 44
End: 2024-05-01
Payer: COMMERCIAL

## 2024-05-01 DIAGNOSIS — N39.0 URINARY TRACT INFECTION, SITE NOT SPECIFIED: ICD-10-CM

## 2024-05-01 PROCEDURE — 99213 OFFICE O/P EST LOW 20 MIN: CPT | Mod: 25

## 2024-05-01 PROCEDURE — 51701 INSERT BLADDER CATHETER: CPT

## 2024-05-01 RX ORDER — CEFUROXIME AXETIL 500 MG/1
500 TABLET ORAL
Qty: 14 | Refills: 0 | Status: ACTIVE | COMMUNITY
Start: 2024-05-01 | End: 1900-01-01

## 2024-05-01 NOTE — PHYSICAL EXAM
[Normal Appearance] : normal appearance [General Appearance - In No Acute Distress] : no acute distress [] : no respiratory distress [Abdomen Soft] : soft [Costovertebral Angle Tenderness] : no ~M costovertebral angle tenderness [RUQ] : in the right upper quadrant [Normal Station and Gait] : the gait and station were normal for the patient's age [Skin Color & Pigmentation] : normal skin color and pigmentation [No Focal Deficits] : no focal deficits [Oriented To Time, Place, And Person] : oriented to person, place, and time [de-identified] : normal peripheral circulation

## 2024-05-02 LAB
APPEARANCE: ABNORMAL
BACTERIA: NEGATIVE /HPF
BILIRUBIN URINE: NEGATIVE
BLOOD URINE: ABNORMAL
CAST: 2 /LPF
COLOR: ABNORMAL
EPITHELIAL CELLS: 0 /HPF
GLUCOSE QUALITATIVE U: NEGATIVE MG/DL
KETONES URINE: NEGATIVE MG/DL
LEUKOCYTE ESTERASE URINE: ABNORMAL
MICROSCOPIC-UA: NORMAL
NITRITE URINE: NEGATIVE
PH URINE: 7
PROTEIN URINE: 100 MG/DL
RED BLOOD CELLS URINE: 1471 /HPF
REVIEW: NORMAL
SPECIFIC GRAVITY URINE: 1.02
UROBILINOGEN URINE: 1 MG/DL
WHITE BLOOD CELLS URINE: 3 /HPF

## 2024-05-03 LAB
BACTERIA UR CULT: NORMAL
URINE CYTOLOGY: NORMAL

## 2024-05-06 ENCOUNTER — APPOINTMENT (OUTPATIENT)
Dept: UROLOGY | Facility: HOSPITAL | Age: 44
End: 2024-05-06

## 2024-05-06 ENCOUNTER — OUTPATIENT (OUTPATIENT)
Dept: INPATIENT UNIT | Facility: HOSPITAL | Age: 44
LOS: 1 days | Discharge: HOME | End: 2024-05-06
Payer: COMMERCIAL

## 2024-05-06 ENCOUNTER — RESULT REVIEW (OUTPATIENT)
Age: 44
End: 2024-05-06

## 2024-05-06 VITALS
HEIGHT: 60 IN | WEIGHT: 182.1 LBS | TEMPERATURE: 98 F | SYSTOLIC BLOOD PRESSURE: 114 MMHG | DIASTOLIC BLOOD PRESSURE: 44 MMHG | OXYGEN SATURATION: 100 % | HEART RATE: 63 BPM | RESPIRATION RATE: 16 BRPM

## 2024-05-06 DIAGNOSIS — Z90.79 ACQUIRED ABSENCE OF OTHER GENITAL ORGAN(S): Chronic | ICD-10-CM

## 2024-05-06 DIAGNOSIS — Z45.89 ENCOUNTER FOR ADJUSTMENT AND MANAGEMENT OF OTHER IMPLANTED DEVICES: Chronic | ICD-10-CM

## 2024-05-06 DIAGNOSIS — Z96.0 PRESENCE OF UROGENITAL IMPLANTS: Chronic | ICD-10-CM

## 2024-05-06 DIAGNOSIS — N20.0 CALCULUS OF KIDNEY: ICD-10-CM

## 2024-05-06 PROCEDURE — C2617: CPT

## 2024-05-06 PROCEDURE — 76000 FLUOROSCOPY <1 HR PHYS/QHP: CPT

## 2024-05-06 PROCEDURE — C1889: CPT

## 2024-05-06 PROCEDURE — C1769: CPT

## 2024-05-06 PROCEDURE — C1758: CPT

## 2024-05-06 PROCEDURE — C1894: CPT

## 2024-05-06 PROCEDURE — 52356 CYSTO/URETERO W/LITHOTRIPSY: CPT | Mod: RT

## 2024-05-06 PROCEDURE — 88300 SURGICAL PATH GROSS: CPT

## 2024-05-06 PROCEDURE — 82365 CALCULUS SPECTROSCOPY: CPT

## 2024-05-06 PROCEDURE — 88300 SURGICAL PATH GROSS: CPT | Mod: 26

## 2024-05-06 RX ORDER — SODIUM CHLORIDE 9 MG/ML
1000 INJECTION, SOLUTION INTRAVENOUS
Refills: 0 | Status: DISCONTINUED | OUTPATIENT
Start: 2024-05-06 | End: 2024-05-06

## 2024-05-06 RX ORDER — TAMSULOSIN HYDROCHLORIDE 0.4 MG/1
1 CAPSULE ORAL
Qty: 30 | Refills: 0
Start: 2024-05-06 | End: 2024-06-04

## 2024-05-06 RX ORDER — CETIRIZINE HYDROCHLORIDE 10 MG/1
1 TABLET ORAL
Refills: 0 | DISCHARGE

## 2024-05-06 RX ORDER — TOPIRAMATE 25 MG
2 TABLET ORAL
Refills: 0 | DISCHARGE

## 2024-05-06 RX ORDER — PHENAZOPYRIDINE HCL 100 MG
200 TABLET ORAL ONCE
Refills: 0 | Status: COMPLETED | OUTPATIENT
Start: 2024-05-06 | End: 2024-05-06

## 2024-05-06 RX ORDER — HYDROMORPHONE HYDROCHLORIDE 2 MG/ML
1 INJECTION INTRAMUSCULAR; INTRAVENOUS; SUBCUTANEOUS EVERY 4 HOURS
Refills: 0 | Status: DISCONTINUED | OUTPATIENT
Start: 2024-05-06 | End: 2024-05-07

## 2024-05-06 RX ORDER — ACETAMINOPHEN 500 MG
975 TABLET ORAL EVERY 6 HOURS
Refills: 0 | Status: DISCONTINUED | OUTPATIENT
Start: 2024-05-06 | End: 2024-05-07

## 2024-05-06 RX ORDER — ONDANSETRON 8 MG/1
4 TABLET, FILM COATED ORAL ONCE
Refills: 0 | Status: DISCONTINUED | OUTPATIENT
Start: 2024-05-06 | End: 2024-05-06

## 2024-05-06 RX ORDER — KETOROLAC TROMETHAMINE 30 MG/ML
1 SYRINGE (ML) INJECTION
Qty: 15 | Refills: 0
Start: 2024-05-06 | End: 2024-05-10

## 2024-05-06 RX ORDER — ACETAMINOPHEN 500 MG
1000 TABLET ORAL ONCE
Refills: 0 | Status: COMPLETED | OUTPATIENT
Start: 2024-05-06 | End: 2024-05-06

## 2024-05-06 RX ORDER — PHENAZOPYRIDINE HCL 100 MG
200 TABLET ORAL THREE TIMES A DAY
Refills: 0 | Status: DISCONTINUED | OUTPATIENT
Start: 2024-05-06 | End: 2024-05-07

## 2024-05-06 RX ORDER — OXYCODONE HYDROCHLORIDE 5 MG/1
5 TABLET ORAL ONCE
Refills: 0 | Status: DISCONTINUED | OUTPATIENT
Start: 2024-05-06 | End: 2024-05-06

## 2024-05-06 RX ORDER — OXYCODONE HYDROCHLORIDE 5 MG/1
5 TABLET ORAL EVERY 4 HOURS
Refills: 0 | Status: DISCONTINUED | OUTPATIENT
Start: 2024-05-06 | End: 2024-05-07

## 2024-05-06 RX ORDER — OXYCODONE HYDROCHLORIDE 5 MG/1
1 TABLET ORAL
Qty: 0 | Refills: 0 | DISCHARGE

## 2024-05-06 RX ORDER — OXYBUTYNIN CHLORIDE 5 MG
5 TABLET ORAL
Refills: 0 | Status: DISCONTINUED | OUTPATIENT
Start: 2024-05-06 | End: 2024-05-07

## 2024-05-06 RX ORDER — OXYBUTYNIN CHLORIDE 5 MG
1 TABLET ORAL
Qty: 15 | Refills: 0
Start: 2024-05-06 | End: 2024-05-10

## 2024-05-06 RX ORDER — FENTANYL CITRATE 50 UG/ML
50 INJECTION INTRAVENOUS
Refills: 0 | Status: DISCONTINUED | OUTPATIENT
Start: 2024-05-06 | End: 2024-05-06

## 2024-05-06 RX ORDER — PHENAZOPYRIDINE HCL 100 MG
1 TABLET ORAL
Qty: 9 | Refills: 1
Start: 2024-05-06 | End: 2024-05-11

## 2024-05-06 RX ORDER — TAMSULOSIN HYDROCHLORIDE 0.4 MG/1
0.4 CAPSULE ORAL AT BEDTIME
Refills: 0 | Status: DISCONTINUED | OUTPATIENT
Start: 2024-05-06 | End: 2024-05-07

## 2024-05-06 RX ORDER — TIZANIDINE 4 MG/1
2 TABLET ORAL
Refills: 0 | DISCHARGE

## 2024-05-06 RX ADMIN — FENTANYL CITRATE 50 MICROGRAM(S): 50 INJECTION INTRAVENOUS at 08:58

## 2024-05-06 RX ADMIN — Medication 200 MILLIGRAM(S): at 21:04

## 2024-05-06 RX ADMIN — OXYCODONE HYDROCHLORIDE 5 MILLIGRAM(S): 5 TABLET ORAL at 10:45

## 2024-05-06 RX ADMIN — TAMSULOSIN HYDROCHLORIDE 0.4 MILLIGRAM(S): 0.4 CAPSULE ORAL at 21:04

## 2024-05-06 RX ADMIN — OXYCODONE HYDROCHLORIDE 5 MILLIGRAM(S): 5 TABLET ORAL at 09:20

## 2024-05-06 RX ADMIN — OXYCODONE HYDROCHLORIDE 5 MILLIGRAM(S): 5 TABLET ORAL at 21:04

## 2024-05-06 RX ADMIN — Medication 400 MILLIGRAM(S): at 13:09

## 2024-05-06 RX ADMIN — Medication 1000 MILLIGRAM(S): at 22:49

## 2024-05-06 RX ADMIN — OXYCODONE HYDROCHLORIDE 5 MILLIGRAM(S): 5 TABLET ORAL at 16:43

## 2024-05-06 RX ADMIN — Medication 5 MILLIGRAM(S): at 21:04

## 2024-05-06 RX ADMIN — FENTANYL CITRATE 50 MICROGRAM(S): 50 INJECTION INTRAVENOUS at 09:17

## 2024-05-06 RX ADMIN — Medication 200 MILLIGRAM(S): at 09:20

## 2024-05-06 RX ADMIN — HYDROMORPHONE HYDROCHLORIDE 1 MILLIGRAM(S): 2 INJECTION INTRAMUSCULAR; INTRAVENOUS; SUBCUTANEOUS at 17:58

## 2024-05-06 RX ADMIN — OXYCODONE HYDROCHLORIDE 5 MILLIGRAM(S): 5 TABLET ORAL at 17:40

## 2024-05-06 RX ADMIN — Medication 1000 MILLIGRAM(S): at 13:40

## 2024-05-06 RX ADMIN — Medication 200 MILLIGRAM(S): at 15:15

## 2024-05-06 RX ADMIN — FENTANYL CITRATE 50 MICROGRAM(S): 50 INJECTION INTRAVENOUS at 09:11

## 2024-05-06 RX ADMIN — Medication 400 MILLIGRAM(S): at 22:34

## 2024-05-06 RX ADMIN — OXYCODONE HYDROCHLORIDE 5 MILLIGRAM(S): 5 TABLET ORAL at 21:34

## 2024-05-06 RX ADMIN — FENTANYL CITRATE 50 MICROGRAM(S): 50 INJECTION INTRAVENOUS at 09:31

## 2024-05-06 RX ADMIN — FENTANYL CITRATE 50 MICROGRAM(S): 50 INJECTION INTRAVENOUS at 11:11

## 2024-05-06 NOTE — ASU PATIENT PROFILE, ADULT - FALL HARM RISK - UNIVERSAL INTERVENTIONS
Bed in lowest position, wheels locked, appropriate side rails in place/Call bell, personal items and telephone in reach/Instruct patient to call for assistance before getting out of bed or chair/Non-slip footwear when patient is out of bed/[place] to call system/Physically safe environment - no spills, clutter or unnecessary equipment/Purposeful Proactive Rounding/Room/bathroom lighting operational, light cord in reach

## 2024-05-06 NOTE — ASU DISCHARGE PLAN (ADULT/PEDIATRIC) - CARE PROVIDER_API CALL
Eladio Mckeon  [address] [place] [address]  [address] [place] [address]    Tel: [phone]  Phone: (   )    -  Fax: (   )    -  Scheduled Appointment: 05/10/2024 12:50 PM

## 2024-05-06 NOTE — BRIEF OPERATIVE NOTE - NSICDXBRIEFPROCEDURE_GEN_ALL_CORE_FT
PROCEDURES:  Ureteroscopy, with laser lithotripsy and stent insertion 06-May-2024 09:12:46 Right Eladio Mckeon

## 2024-05-06 NOTE — HISTORY OF PRESENT ILLNESS
[FreeTextEntry1] : 43-year-old female presents for catheterized specimen. Urine culture from PST is positive. Scheduled for right ureteroscopy on 5/6/2024 at [facility]. Has discolored urine on and off. Complaining of right-sided abdominal pain. Takes Tramadol twice a day for migraine still continues to have pain requesting pain medication. Denies dysuria, nausea, vomiting, fever, chills or rigors.   Seen on 4/11/2024 for kidney stone and ureteral stent. Patient went to emergency department at the [facility] in [place] in March with complaints of flank pain. As per patient a ureteral stent was placed for obstructing right kidney stone.  No prior episodes.  No family history of kidney stones. Patient had since relocated to [place], [place]. Patient went to [facility] ED on 4/4/2024 with complaints of right-sided abdominal pain and gross hematuria. CT scan abdomen and pelvis: Has bilateral kidney stones with indwelling right ureteral stent. Also had ultrasound, was told has gallbladder sludge.  Will be seeing surgeon for possible cholecystectomy. Patient was sent home on cefpodoxime, gross hematuria has resolved continues to have right-sided abdominal pain. Denied any difficulty with urination.  Denied nausea, vomiting, fever, chills or rigors.   4/4/2024: WBC: 10.10, Creatinine: 1.08 Urine culture: Streptococcus agalactiae CT scan of the abdomen and pelvis: 1 cm right lower pole kidney stone with indwelling ureteral stent. Multiple left kidney stones, largest 0.6 cm.

## 2024-05-06 NOTE — ASU DISCHARGE PLAN (ADULT/PEDIATRIC) - PROVIDER TOKENS
FREE:[LAST:[Linda],FIRST:[Eladio],PHONE:[(   )    -],FAX:[(   )    -],ADDRESS:[[address] [place] [address]    Tel: [phone]],SCHEDULEDAPPT:[05/10/2024],SCHEDULEDAPPTTIME:[12:50 PM]]

## 2024-05-06 NOTE — ASU PATIENT PROFILE, ADULT - NSICDXPASTMEDICALHX_GEN_ALL_CORE_FT
PAST MEDICAL HISTORY:  Bleeding gastric ulcer     Cervical herniated disc     Chronic sinusitis     Degenerative lumbar disc     Fibromyalgia     Gallbladder sludge     Gastric peptic ulcer     HPV in female     Migraine     Obese     Obesity     Right kidney stone     Seasonal allergies

## 2024-05-06 NOTE — ASU DISCHARGE PLAN (ADULT/PEDIATRIC) - NS MD DC FALL RISK RISK
For information on Fall & Injury Prevention, visit: [email or web address] OR  [email or web address] OR  https://www.cdc.gov/steadi/patient.html

## 2024-05-06 NOTE — ASSESSMENT
[FreeTextEntry1] : Kidney stone: Obtained catheterized specimen: Will get Urinalysis and Urine culture.  Start Cefuroxime.  Sent prescription for Percocet.  Scheduled for right ureteroscopy on 5/6/2024.  Will inform results.

## 2024-05-07 ENCOUNTER — NON-APPOINTMENT (OUTPATIENT)
Age: 44
End: 2024-05-07

## 2024-05-07 ENCOUNTER — TRANSCRIPTION ENCOUNTER (OUTPATIENT)
Age: 44
End: 2024-05-07

## 2024-05-07 VITALS
HEART RATE: 69 BPM | SYSTOLIC BLOOD PRESSURE: 102 MMHG | OXYGEN SATURATION: 98 % | RESPIRATION RATE: 16 BRPM | TEMPERATURE: 98 F | DIASTOLIC BLOOD PRESSURE: 51 MMHG

## 2024-05-07 LAB — SURGICAL PATHOLOGY STUDY: SIGNIFICANT CHANGE UP

## 2024-05-07 RX ORDER — OXYCODONE AND ACETAMINOPHEN 5; 325 MG/1; MG/1
5-325 TABLET ORAL
Qty: 16 | Refills: 0 | Status: ACTIVE | COMMUNITY
Start: 2024-05-01 | End: 1900-01-01

## 2024-05-07 RX ADMIN — OXYCODONE HYDROCHLORIDE 5 MILLIGRAM(S): 5 TABLET ORAL at 11:42

## 2024-05-07 RX ADMIN — HYDROMORPHONE HYDROCHLORIDE 1 MILLIGRAM(S): 2 INJECTION INTRAMUSCULAR; INTRAVENOUS; SUBCUTANEOUS at 08:53

## 2024-05-07 RX ADMIN — Medication 975 MILLIGRAM(S): at 11:58

## 2024-05-07 RX ADMIN — Medication 5 MILLIGRAM(S): at 10:42

## 2024-05-07 RX ADMIN — OXYCODONE HYDROCHLORIDE 5 MILLIGRAM(S): 5 TABLET ORAL at 10:42

## 2024-05-07 RX ADMIN — Medication 200 MILLIGRAM(S): at 04:04

## 2024-05-07 RX ADMIN — HYDROMORPHONE HYDROCHLORIDE 1 MILLIGRAM(S): 2 INJECTION INTRAMUSCULAR; INTRAVENOUS; SUBCUTANEOUS at 04:19

## 2024-05-07 RX ADMIN — HYDROMORPHONE HYDROCHLORIDE 1 MILLIGRAM(S): 2 INJECTION INTRAMUSCULAR; INTRAVENOUS; SUBCUTANEOUS at 13:47

## 2024-05-07 RX ADMIN — HYDROMORPHONE HYDROCHLORIDE 1 MILLIGRAM(S): 2 INJECTION INTRAMUSCULAR; INTRAVENOUS; SUBCUTANEOUS at 04:04

## 2024-05-07 RX ADMIN — HYDROMORPHONE HYDROCHLORIDE 1 MILLIGRAM(S): 2 INJECTION INTRAMUSCULAR; INTRAVENOUS; SUBCUTANEOUS at 13:32

## 2024-05-07 RX ADMIN — HYDROMORPHONE HYDROCHLORIDE 1 MILLIGRAM(S): 2 INJECTION INTRAMUSCULAR; INTRAVENOUS; SUBCUTANEOUS at 08:38

## 2024-05-07 RX ADMIN — Medication 975 MILLIGRAM(S): at 12:58

## 2024-05-07 RX ADMIN — Medication 200 MILLIGRAM(S): at 13:32

## 2024-05-07 RX ADMIN — OXYCODONE HYDROCHLORIDE 5 MILLIGRAM(S): 5 TABLET ORAL at 03:27

## 2024-05-07 RX ADMIN — OXYCODONE HYDROCHLORIDE 5 MILLIGRAM(S): 5 TABLET ORAL at 02:57

## 2024-05-07 NOTE — DISCHARGE NOTE NURSING/CASE MANAGEMENT/SOCIAL WORK - PATIENT PORTAL LINK FT
You can access the FollowMyHealth Patient Portal offered by [facility] by registering at the following website: [email or web address]. By joining [employer]’s FollowMyHealth portal, you will also be able to view your health information using other applications (apps) compatible with our system.

## 2024-05-07 NOTE — PROGRESS NOTE ADULT - SUBJECTIVE AND OBJECTIVE BOX
Pt resting in bed.  Alek diet and alek pain with pain medication.  Voiding no other complaints.    Vital Signs Last 24 Hrs  T(C): 36.5 (07 May 2024 08:00), Max: 37 (06 May 2024 12:20)  T(F): 97.7 (07 May 2024 08:00), Max: 98.6 (06 May 2024 12:20)  HR: 69 (07 May 2024 08:00) (62 - 95)  BP: 102/51 (07 May 2024 08:00) (94/50 - 122/61)  BP(mean): --  RR: 16 (07 May 2024 08:00) (12 - 16)  SpO2: 98% (07 May 2024 08:00) (94% - 98%)    Parameters below as of 07 May 2024 08:00  Patient On (Oxygen Delivery Method): room air        I&O's Summary    06 May 2024 07:01  -  07 May 2024 07:00  --------------------------------------------------------  IN: 600 mL / OUT: 200 mL / NET: 400 mL        Physical Exam  Gen: NAD, A&Ox3  Pulm: No respiratory distress, no subcostal retractions  CV: RRR, no JVD  Abd: Soft, NT, ND, no bladder palp  Back: mild right CVA tenderness  : Pt voiding

## 2024-05-10 ENCOUNTER — APPOINTMENT (OUTPATIENT)
Dept: UROLOGY | Facility: CLINIC | Age: 44
End: 2024-05-10
Payer: COMMERCIAL

## 2024-05-10 VITALS
OXYGEN SATURATION: 98 % | HEIGHT: 60 IN | WEIGHT: 182 LBS | DIASTOLIC BLOOD PRESSURE: 75 MMHG | SYSTOLIC BLOOD PRESSURE: 115 MMHG | HEART RATE: 73 BPM | BODY MASS INDEX: 35.73 KG/M2

## 2024-05-10 DIAGNOSIS — N20.0 CALCULUS OF KIDNEY: ICD-10-CM

## 2024-05-10 DIAGNOSIS — E66.9 OBESITY, UNSPECIFIED: ICD-10-CM

## 2024-05-10 DIAGNOSIS — E83.50 UNSPECIFIED DISORDER OF CALCIUM METABOLISM: ICD-10-CM

## 2024-05-10 DIAGNOSIS — Z88.0 ALLERGY STATUS TO PENICILLIN: ICD-10-CM

## 2024-05-10 DIAGNOSIS — Z46.6 ENCOUNTER FOR FITTING AND ADJUSTMENT OF URINARY DEVICE: ICD-10-CM

## 2024-05-10 LAB
CELL MATERIAL STONE EST-MCNT: SIGNIFICANT CHANGE UP
LABORATORY COMMENT REPORT: SIGNIFICANT CHANGE UP
NIDUS STONE QN: SIGNIFICANT CHANGE UP

## 2024-05-10 PROCEDURE — 99214 OFFICE O/P EST MOD 30 MIN: CPT | Mod: 25

## 2024-05-10 PROCEDURE — 52310 CYSTOSCOPY AND TREATMENT: CPT

## 2024-05-21 ENCOUNTER — NON-APPOINTMENT (OUTPATIENT)
Age: 44
End: 2024-05-21

## 2024-05-21 PROBLEM — N20.0 KIDNEY STONE ON RIGHT SIDE: Status: ACTIVE | Noted: 2024-04-12

## 2024-05-21 PROBLEM — Z46.6 ENCOUNTER FOR REMOVAL OF URETERAL STENT: Status: ACTIVE | Noted: 2024-05-21

## 2024-05-21 NOTE — ASSESSMENT
[FreeTextEntry1] : Reviewed records. Discussed labs and imaging.   Kidney stone: Encounter for stent removal: Status post right ureteroscopy. Performed cystoscopy and right ureteral stent removal today.  Has multiple left kidney stones, largest 0.6 cm. Discussed treatment options.  Patient will consider her options. Will get Renal Bladder Ultrasound and X ray of Kidney, Ureter and Bladder before follow up appointment.  Disorder of calcium metabolism: Stone analysis pending. Discussed Kidney stone prevention diet: Recommended good oral hydration so that urine is clear to light yellow, usually 1.5 to 2 Liters of fluids, mainly water. Recommended increasing Citrate in diet by consuming citrus fruits and juices: holly, limes, oranges, grapefruits and berries. Recommended to consume less red meat. Recommended less salt intake.  Recommended to limit foods with oxalate: dark green vegetables, rhubarb, chocolate, wheat bran, nuts, cranberries, and beans.  Patient was provided with instruction sheet.  Has gastric ulcer, does not tolerate citrus food.  Will get Kidney stone metabolic work up: Parathyroid Hormone with Calcium, Uric acid and 24-Hour urine kidney stone risk profile with [facility] before follow up appointment.    Return to office in 2 months or sooner if any issues.

## 2024-05-21 NOTE — HISTORY OF PRESENT ILLNESS
[FreeTextEntry1] : 43-year-old female presents for ureteral stent removal. Has some pain/discomfort from ureteral stent.  Status post right ureteroscopy, laser lithotripsy, stone extraction and ureteral stent exchange on 5/6/2024 at [facility].  Seen on 4/11/2024 for kidney stone and ureteral stent. Patient went to emergency department at the hospital in [place] in March with complaints of flank pain. As per patient a ureteral stent was placed for obstructing right kidney stone.  No prior episodes.  No family history of kidney stones. Patient had since relocated to [place], [place]. Patient went to [facility] ED on 4/4/2024 with complaints of right-sided abdominal pain and gross hematuria. CT scan abdomen and pelvis: Has bilateral kidney stones with indwelling right ureteral stent. Also had ultrasound, was told has gallbladder sludge.  Will be seeing surgeon for possible cholecystectomy. Patient was sent home on cefpodoxime, gross hematuria has resolved continues to have right-sided abdominal pain. Denied any difficulty with urination.  Denied nausea, vomiting, fever, chills or rigors.   4/4/2024: WBC: 10.10, Creatinine: 1.08 Urine culture: Streptococcus agalactiae CT scan of the abdomen and pelvis: 1 cm right lower pole kidney stone with indwelling ureteral stent. Multiple left kidney stones, largest 0.6 cm.

## 2024-06-20 RX ORDER — ZOLPIDEM TARTRATE 5 MG/1
5 TABLET ORAL
Qty: 30 | Refills: 0 | Status: ACTIVE | COMMUNITY
Start: 2024-04-30 | End: 1900-01-01

## 2024-07-18 ENCOUNTER — APPOINTMENT (OUTPATIENT)
Dept: UROLOGY | Facility: CLINIC | Age: 44
End: 2024-07-18